# Patient Record
Sex: FEMALE | Race: ASIAN | NOT HISPANIC OR LATINO
[De-identification: names, ages, dates, MRNs, and addresses within clinical notes are randomized per-mention and may not be internally consistent; named-entity substitution may affect disease eponyms.]

---

## 2017-03-22 ENCOUNTER — APPOINTMENT (OUTPATIENT)
Dept: DERMATOLOGY | Facility: CLINIC | Age: 22
End: 2017-03-22

## 2018-10-04 ENCOUNTER — APPOINTMENT (OUTPATIENT)
Dept: FAMILY MEDICINE | Facility: CLINIC | Age: 23
End: 2018-10-04
Payer: COMMERCIAL

## 2018-10-04 ENCOUNTER — MED ADMIN CHARGE (OUTPATIENT)
Age: 23
End: 2018-10-04

## 2018-10-04 VITALS
TEMPERATURE: 98 F | SYSTOLIC BLOOD PRESSURE: 113 MMHG | BODY MASS INDEX: 18.44 KG/M2 | OXYGEN SATURATION: 98 % | DIASTOLIC BLOOD PRESSURE: 79 MMHG | WEIGHT: 108 LBS | HEIGHT: 64 IN | HEART RATE: 96 BPM

## 2018-10-04 DIAGNOSIS — Z78.9 OTHER SPECIFIED HEALTH STATUS: ICD-10-CM

## 2018-10-04 DIAGNOSIS — S02.91XA UNSPECIFIED FRACTURE OF SKULL, INITIAL ENCOUNTER FOR CLOSED FRACTURE: ICD-10-CM

## 2018-10-04 DIAGNOSIS — D50.8 OTHER IRON DEFICIENCY ANEMIAS: ICD-10-CM

## 2018-10-04 DIAGNOSIS — S06.339A UNSPECIFIED FRACTURE OF SKULL, INITIAL ENCOUNTER FOR CLOSED FRACTURE: ICD-10-CM

## 2018-10-04 DIAGNOSIS — Z23 ENCOUNTER FOR IMMUNIZATION: ICD-10-CM

## 2018-10-04 DIAGNOSIS — E61.1 IRON DEFICIENCY: ICD-10-CM

## 2018-10-04 DIAGNOSIS — Z86.19 PERSONAL HISTORY OF OTHER INFECTIOUS AND PARASITIC DISEASES: ICD-10-CM

## 2018-10-04 DIAGNOSIS — Z00.00 ENCOUNTER FOR GENERAL ADULT MEDICAL EXAMINATION W/OUT ABNORMAL FINDINGS: ICD-10-CM

## 2018-10-04 PROCEDURE — 99385 PREV VISIT NEW AGE 18-39: CPT | Mod: 25

## 2018-10-04 PROCEDURE — G0008: CPT

## 2018-10-04 PROCEDURE — 90686 IIV4 VACC NO PRSV 0.5 ML IM: CPT

## 2018-10-04 PROCEDURE — G0444 DEPRESSION SCREEN ANNUAL: CPT

## 2018-10-04 PROCEDURE — 36415 COLL VENOUS BLD VENIPUNCTURE: CPT

## 2018-10-04 RX ORDER — NORGESTIMATE AND ETHINYL ESTRADIOL 7DAYSX3 28
KIT ORAL
Refills: 0 | Status: ACTIVE | COMMUNITY

## 2018-10-04 NOTE — HISTORY OF PRESENT ILLNESS
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 On birth control (tri-linyah)--purchased from an scooby on her phone. Takes as directed, ordered from an scooby. Not on other meds. NKDA.\par Here to establish care. 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 Denies fevers, chills, currently suffering from URI, denies all else on ROS. Interested in establishing care.

## 2018-10-04 NOTE — PLAN
[FreeTextEntry1] : healthcare maintenance: well visit, flu shot today, follow up labs and referrals (psych/ therapy, and gyn)\par \par depression: spent time counseling on depression, resources available, and options. PHQ2 was positive, PHQ9 11 (mod depression), discussed different options- therapy/psych, meds, interested in therapy/ psych as of now, gave a list of phone numbers for [facility] Psych/ therapy offices and will follow up referral, will return if no success or interested in medication route. Denies suicidal/ homicidal thoughts at present. If thoughts do arise has a strong support system to help. Attributes depression to her birth control but not interested in switching at present. \par \par hx of anemia: likely at time secondary to decreased eating post op, will follow up blood panel \par \par hx of molluscum: encouraged follow up with gyn for pap/ routine visit and for treatment options (currently not bothering patient, explained the possibility for continued transmittably to partner), recently tested for stds (neg.)\par - also needs to discuss birth control at gyn appt. \par \par URI/ seasonal allergies: encouraged rest, hydration, hot liquids (soup/ tea), honey. If worsening symptoms, coughing that does not go away, fevers, return to the office. At this point in absence of fever and symptoms that are improving, likely viral and encouraged supportive care/ otc remedies. \par \par discussed body weight, and healthy BMI >18.5 and she is on the cusp \par \par follow up as needed, 1 year or sooner to follow up discussion on depression/ therapy \par \par \par \par

## 2018-10-04 NOTE — HEALTH RISK ASSESSMENT
[No falls in past year] : Patient reported no falls in the past year [2] : 2) Feeling down, depressed, or hopeless for more than half of the days (2) [HIV test declined] : HIV test declined [None] : None [# of Members in Household ___] :  household currently consist of [unfilled] member(s) [Employed] : employed [College] : College [Significant Other] : lives with significant other [Sexually Active] : sexually active [Feels Safe at Home] : Feels safe at home [Fully functional (bathing, dressing, toileting, transferring, walking, feeding)] : Fully functional (bathing, dressing, toileting, transferring, walking, feeding) [Fully functional (using the telephone, shopping, preparing meals, housekeeping, doing laundry, using] : Fully functional and needs no help or supervision to perform IADLs (using the telephone, shopping, preparing meals, housekeeping, doing laundry, using transportation, managing medications and managing finances) [Hepatitis C test declined] : Hepatitis C test declined [] : No [de-identified] : no [de-identified] : none, will recommend a gynecologist  [[id number]] : 4 [Change in mental status noted] : No change in mental status noted [Language] : denies difficulty with language [Handling Complex Tasks] : denies difficulty handling complex tasks [High Risk Behavior] : no high risk behavior [Reports changes in hearing] : Reports no changes in hearing [Reports changes in vision] : Reports no changes in vision [Reports changes in dental health] : Reports no changes in dental health [HIVDate] : 07/18 [HIVComments] : normal  [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

## 2018-10-05 LAB
BASOPHILS # BLD AUTO: 0.05 K/UL
BASOPHILS NFR BLD AUTO: 0.5 %
EOSINOPHIL # BLD AUTO: 0.83 K/UL
EOSINOPHIL NFR BLD AUTO: 9.1 %
FOLATE SERPL-MCNC: 18.3 NG/ML
HCT VFR BLD CALC: 40.8 %
HGB BLD-MCNC: 12.4 G/DL
IMM GRANULOCYTES NFR BLD AUTO: 0.1 %
IRON SATN MFR SERPL: 19 %
IRON SERPL-MCNC: 60 UG/DL
LYMPHOCYTES # BLD AUTO: 1.5 K/UL
LYMPHOCYTES NFR BLD AUTO: 16.4 %
MAN DIFF?: NORMAL
MCHC RBC-ENTMCNC: 29.5 PG
MCHC RBC-ENTMCNC: 30.4 GM/DL
MCV RBC AUTO: 97.1 FL
MONOCYTES # BLD AUTO: 0.53 K/UL
MONOCYTES NFR BLD AUTO: 5.8 %
NEUTROPHILS # BLD AUTO: 6.24 K/UL
NEUTROPHILS NFR BLD AUTO: 68.1 %
PLATELET # BLD AUTO: 207 K/UL
RBC # BLD: 4.2 M/UL
RBC # FLD: 13.5 %
TIBC SERPL-MCNC: 323 UG/DL
UIBC SERPL-MCNC: 263 UG/DL
VIT B12 SERPL-MCNC: 356 PG/ML
WBC # FLD AUTO: 9.16 K/UL

## 2018-11-05 ENCOUNTER — APPOINTMENT (OUTPATIENT)
Dept: FAMILY MEDICINE | Facility: CLINIC | Age: 23
End: 2018-11-05
Payer: COMMERCIAL

## 2018-11-05 VITALS
BODY MASS INDEX: 18.44 KG/M2 | OXYGEN SATURATION: 100 % | WEIGHT: 108 LBS | TEMPERATURE: 97.6 F | HEIGHT: 64 IN | SYSTOLIC BLOOD PRESSURE: 126 MMHG | DIASTOLIC BLOOD PRESSURE: 79 MMHG | HEART RATE: 81 BPM

## 2018-11-05 PROCEDURE — 99214 OFFICE O/P EST MOD 30 MIN: CPT

## 2018-11-06 ENCOUNTER — TRANSCRIPTION ENCOUNTER (OUTPATIENT)
Age: 23
End: 2018-11-06

## 2018-11-07 ENCOUNTER — RX CHANGE (OUTPATIENT)
Age: 23
End: 2018-11-07

## 2018-11-07 NOTE — HEALTH RISK ASSESSMENT
[] : No [No falls in past year] : Patient reported no falls in the past year [2] : 1) Little interest or pleasure doing things for more than half of the days (2) [1] : 2) Feeling down, depressed, or hopeless for several days (1) [de-identified] : none [de-identified] : none [de-identified] : socially [VQG8Saple] : 3 [NSP9Wwwem] : 11 at prior visit last month

## 2018-11-07 NOTE — HISTORY OF PRESENT ILLNESS
[FreeTextEntry8] : 22 yo f presents with depression (nonsuicidal, nonhomicidal). Previous diagnosis of ADHD as a child and was on ritalin intermittently in college. Thought she "was doing the college thing" by taking it intermittently. Post college she has not been able to keep a job. She started tearing in the office. She cannot focus at work or at home. She has not been able to keep a job over the past 4 years-- since college she has lost 3 jobs. Denies fevers, chills, cp, sob, and all else on ROS. Denies anxiety. Stated grew up in a household where depression/ stress/ ADHD was not considered a real complaint. She mentioned it has taken a lot for her to come to the office to discuss this. She remarked she has tried to call therapists in the area-- either they do not take her insurance or they have no availability to after the new year. I offered more numbers but she stated she is waiting to hear back from a few places still.

## 2018-11-07 NOTE — REVIEW OF SYSTEMS
[Suicidal] : not suicidal [Insomnia] : no insomnia [Anxiety] : no anxiety [Depression] : depression [Negative] : Heme/Lymph [de-identified] : cannot focus

## 2018-11-07 NOTE — PLAN
[FreeTextEntry1] : ADHD: encouraged to start medication for depression (last visit PHQ9 was 11- depression), she stated she needs something to work fast because she is at risk of losing her job. Stressed how methylphenidate is a bandaid and will not fix her underlying depression which is likely causing her difficulty focusing and working/ functioning properly at work. She has a follow up with me in 1 month to check labs as well as her progress on the minimal amount of meds I perscribed. She agreed to come in in 1 month. She has hopes to schedule an appt with a therapist at [place] in the meantime. At the followup visit we will seriously discuss starting a medication like lexapro as opposed to this. She was reluctant to try lexapro without trying this first. Risks and benefits discussed. Strongly suggested against not taking this medication on weekends, only in AM before work as needed, and not to mix with alcohol or other drugs. Will follow up in 1 month. Pt. will call me in 2 weeks to update me.

## 2018-11-08 ENCOUNTER — TRANSCRIPTION ENCOUNTER (OUTPATIENT)
Age: 23
End: 2018-11-08

## 2018-11-08 RX ORDER — METHYLPHENIDATE HYDROCHLORIDE 10 MG/1
10 CAPSULE, EXTENDED RELEASE ORAL DAILY
Qty: 15 | Refills: 0 | Status: DISCONTINUED | COMMUNITY
Start: 2018-11-07 | End: 2018-11-08

## 2018-11-08 RX ORDER — METHYLPHENIDATE HYDROCHLORIDE 10 MG/1
10 CAPSULE, EXTENDED RELEASE ORAL DAILY
Qty: 15 | Refills: 0 | Status: DISCONTINUED | COMMUNITY
Start: 2018-11-05 | End: 2018-11-08

## 2018-11-09 ENCOUNTER — TRANSCRIPTION ENCOUNTER (OUTPATIENT)
Age: 23
End: 2018-11-09

## 2018-12-06 ENCOUNTER — APPOINTMENT (OUTPATIENT)
Dept: FAMILY MEDICINE | Facility: CLINIC | Age: 23
End: 2018-12-06
Payer: COMMERCIAL

## 2018-12-06 VITALS
HEIGHT: 64 IN | HEART RATE: 69 BPM | TEMPERATURE: 98.8 F | SYSTOLIC BLOOD PRESSURE: 125 MMHG | OXYGEN SATURATION: 99 % | DIASTOLIC BLOOD PRESSURE: 77 MMHG

## 2018-12-06 PROCEDURE — 99214 OFFICE O/P EST MOD 30 MIN: CPT

## 2018-12-06 NOTE — HEALTH RISK ASSESSMENT
[] : No [No falls in past year] : Patient reported no falls in the past year [1] : 2) Feeling down, depressed, or hopeless for several days (1) [de-identified] : socially on weekends [[id number]] : 2

## 2018-12-06 NOTE — PLAN
[FreeTextEntry1] : follow up visit\par \par ADHD: cont. current medication, follow up in 2 months, full blood panel at that time, will refill meds if interested\par \par depression: meeting with therapist, mood and affect much improved, after therapy will follow up with me and we will discuss antidepressants vs. cont. ADHD meds \par \par weight management: (BMI less than 19)discussed side effect of weight loss while on ADHD medication, already low BMI, encouraged healthy eating habits \par  \par follow up in 2 months for labs and depression/ ADHD

## 2018-12-06 NOTE — COUNSELING
[Weight management counseling provided] : Weight management [Healthy eating counseling provided] : healthy eating [Activity counseling provided] : activity [Behavioral health counseling provided] : behavioral health  [Low Salt Diet] : Low salt diet [Walking] : Walking [Engage in a relaxing activity] : Engage in a relaxing activity [Plan in advance] : Plan in advance [None] : None [Good understanding] : Patient has a good understanding of lifestyle changes and the steps needed to achieve self management goals

## 2018-12-06 NOTE — HISTORY OF PRESENT ILLNESS
[FreeTextEntry8] : 22 yo f with pmhx of ADHD presents for medication follow up. Pt. has not experienced any side effects of the medication and has only been taking it M-F when working, she is not drinking on the medication or experimenting with other medications. Pt. states that she has had more energy throughout the day, better focus--her boss commented on her improved work ethic, she is drinking less coffee, sleeping better, no longer at risk to lose her job, and she is volunteering after work/ going out more. She feels less hopeless, less depressed, her mood is elevated. She is seeing therapist next week (Wednesday/ Thursday) for an initial evaluation which she is looking forward to. Denies fevers, chills, cp, sob, loss of appetite, increased figidty, and all else on ROS. Already had a flu shot this year, up to date with tetanus. \par ADHD: improved focus\par depression: mood improved\par weight management: stable per patient

## 2019-01-07 ENCOUNTER — TRANSCRIPTION ENCOUNTER (OUTPATIENT)
Age: 24
End: 2019-01-07

## 2019-02-07 ENCOUNTER — APPOINTMENT (OUTPATIENT)
Dept: FAMILY MEDICINE | Facility: CLINIC | Age: 24
End: 2019-02-07

## 2019-02-21 ENCOUNTER — APPOINTMENT (OUTPATIENT)
Dept: FAMILY MEDICINE | Facility: CLINIC | Age: 24
End: 2019-02-21
Payer: COMMERCIAL

## 2019-02-21 VITALS
DIASTOLIC BLOOD PRESSURE: 74 MMHG | SYSTOLIC BLOOD PRESSURE: 108 MMHG | HEIGHT: 64 IN | BODY MASS INDEX: 18.1 KG/M2 | WEIGHT: 106 LBS | HEART RATE: 69 BPM | TEMPERATURE: 98.2 F | OXYGEN SATURATION: 100 %

## 2019-02-21 DIAGNOSIS — F90.8 ATTENTION-DEFICIT HYPERACTIVITY DISORDER, OTHER TYPE: ICD-10-CM

## 2019-02-21 PROCEDURE — 99213 OFFICE O/P EST LOW 20 MIN: CPT

## 2019-02-21 RX ORDER — METHYLPHENIDATE HYDROCHLORIDE 10 MG/1
10 CAPSULE, EXTENDED RELEASE ORAL DAILY
Qty: 20 | Refills: 0 | Status: DISCONTINUED | COMMUNITY
Start: 2018-12-06 | End: 2019-02-21

## 2019-02-21 RX ORDER — METHYLPHENIDATE HYDROCHLORIDE 10 MG/1
10 CAPSULE, EXTENDED RELEASE ORAL DAILY
Qty: 15 | Refills: 0 | Status: DISCONTINUED | COMMUNITY
Start: 2018-11-08 | End: 2019-02-21

## 2019-02-21 NOTE — HISTORY OF PRESENT ILLNESS
[FreeTextEntry1] : follow up medications  [de-identified] : 24 yo f presents for follow up of medications. Was started on ADHD medications the past few visits and dosing was increased. Since last visit patient remarked she had lost her job so she has not been taking the medications. She mentioned that she feels depressed but is in good spirits as she has a few job interviews lined up. Pt. stated that she interested in treating her depression and is interested in starting to discuss medication options. Pt. had mentioned she had a meeting with a therapist at [place] and the one visit went well and she looks forward to continuing with them. Today the pt. denies fevers, chills, cp, sob, and all else on ROS. Pt. denies suicidal and homicidal ideations. Last PHQ9 in Oct. 18 was positive for depression. Will repeat at next visit after has been on medication.

## 2019-02-21 NOTE — HEALTH RISK ASSESSMENT
[No falls in past year] : Patient reported no falls in the past year [3] : 2) Feeling down, depressed, or hopeless for nearly every day (3) [] : No [de-identified] : occasionally [de-identified] : walking  [FAQ1Hjysq] : 6 [YBL1Fmtnd] : 11

## 2019-02-28 ENCOUNTER — TRANSCRIPTION ENCOUNTER (OUTPATIENT)
Age: 24
End: 2019-02-28

## 2019-03-21 ENCOUNTER — APPOINTMENT (OUTPATIENT)
Dept: FAMILY MEDICINE | Facility: CLINIC | Age: 24
End: 2019-03-21

## 2019-04-05 ENCOUNTER — TRANSCRIPTION ENCOUNTER (OUTPATIENT)
Age: 24
End: 2019-04-05

## 2019-05-08 ENCOUNTER — RX RENEWAL (OUTPATIENT)
Age: 24
End: 2019-05-08

## 2019-07-08 ENCOUNTER — RX RENEWAL (OUTPATIENT)
Age: 24
End: 2019-07-08

## 2019-07-15 ENCOUNTER — APPOINTMENT (OUTPATIENT)
Dept: FAMILY MEDICINE | Facility: CLINIC | Age: 24
End: 2019-07-15

## 2019-07-18 ENCOUNTER — RX RENEWAL (OUTPATIENT)
Age: 24
End: 2019-07-18

## 2019-07-19 ENCOUNTER — APPOINTMENT (OUTPATIENT)
Dept: FAMILY MEDICINE | Facility: CLINIC | Age: 24
End: 2019-07-19

## 2019-07-25 ENCOUNTER — MEDICATION RENEWAL (OUTPATIENT)
Age: 24
End: 2019-07-25

## 2019-08-05 ENCOUNTER — APPOINTMENT (OUTPATIENT)
Dept: FAMILY MEDICINE | Facility: CLINIC | Age: 24
End: 2019-08-05
Payer: COMMERCIAL

## 2019-08-05 VITALS
WEIGHT: 103 LBS | DIASTOLIC BLOOD PRESSURE: 79 MMHG | BODY MASS INDEX: 17.58 KG/M2 | SYSTOLIC BLOOD PRESSURE: 114 MMHG | HEART RATE: 58 BPM | OXYGEN SATURATION: 100 % | HEIGHT: 64 IN | TEMPERATURE: 98 F

## 2019-08-05 DIAGNOSIS — F32.9 MAJOR DEPRESSIVE DISORDER, SINGLE EPISODE, UNSPECIFIED: ICD-10-CM

## 2019-08-05 PROCEDURE — 99213 OFFICE O/P EST LOW 20 MIN: CPT

## 2019-08-06 PROBLEM — F32.9 DEPRESSION: Status: ACTIVE | Noted: 2018-10-04

## 2019-08-06 NOTE — HISTORY OF PRESENT ILLNESS
[FreeTextEntry1] : follow up meds [de-identified] : 22 yo f presents to discuss her lexapro. \par Pt. has missed/ [facility] the past few appts. \par Pt. was started on lexapro 5 mg, and is very happy with the choice to be on an everyday medication. \par Pt. was recently hired for a new job. \par She mentions she recently stopped her birth control and is considering restarting it. \par Today pt. denies fevers, chills, cp, sob, and is in her usual state of health.

## 2019-08-06 NOTE — COUNSELING
[Behavioral health counseling provided] : Behavioral health counseling provided [Engage in a relaxing activity] : Engage in a relaxing activity [Plan in advance] : Plan in advance [None] : None [Good understanding] : Patient has a good understanding of lifestyle changes and steps needed to achieve self management goal

## 2019-08-06 NOTE — PLAN
[FreeTextEntry1] : will do labs next visit.\par follow up in 6 weeks to discuss increased medication dose.

## 2019-08-06 NOTE — PHYSICAL EXAM
[No Acute Distress] : no acute distress [Well Nourished] : well nourished [Well Developed] : well developed [Normal Sclera/Conjunctiva] : normal sclera/conjunctiva [Well-Appearing] : well-appearing [EOMI] : extraocular movements intact [PERRL] : pupils equal round and reactive to light [Normal Outer Ear/Nose] : the outer ears and nose were normal in appearance [Normal Oropharynx] : the oropharynx was normal [No JVD] : no jugular venous distention [No Lymphadenopathy] : no lymphadenopathy [Supple] : supple [Thyroid Normal, No Nodules] : the thyroid was normal and there were no nodules present [No Respiratory Distress] : no respiratory distress  [No Accessory Muscle Use] : no accessory muscle use [Clear to Auscultation] : lungs were clear to auscultation bilaterally [Regular Rhythm] : with a regular rhythm [Normal Rate] : normal rate  [Normal S1, S2] : normal S1 and S2 [No Carotid Bruits] : no carotid bruits [No Murmur] : no murmur heard [No Abdominal Bruit] : a ~M bruit was not heard ~T in the abdomen [No Varicosities] : no varicosities [Pedal Pulses Present] : the pedal pulses are present [No Edema] : there was no peripheral edema [No Palpable Aorta] : no palpable aorta [No Extremity Clubbing/Cyanosis] : no extremity clubbing/cyanosis [Soft] : abdomen soft [Non Tender] : non-tender [Non-distended] : non-distended [No Masses] : no abdominal mass palpated [No HSM] : no HSM [Normal Bowel Sounds] : normal bowel sounds [Normal Posterior Cervical Nodes] : no posterior cervical lymphadenopathy [Normal Anterior Cervical Nodes] : no anterior cervical lymphadenopathy [No CVA Tenderness] : no CVA  tenderness [No Spinal Tenderness] : no spinal tenderness [No Joint Swelling] : no joint swelling [Grossly Normal Strength/Tone] : grossly normal strength/tone [No Rash] : no rash [Coordination Grossly Intact] : coordination grossly intact [Normal Gait] : normal gait [No Focal Deficits] : no focal deficits [Normal Affect] : the affect was normal [Deep Tendon Reflexes (DTR)] : deep tendon reflexes were 2+ and symmetric [Normal Insight/Judgement] : insight and judgment were intact

## 2019-08-06 NOTE — REVIEW OF SYSTEMS
[de-identified] : admits less anxiety and depression, admits increased focus at work  [Negative] : Heme/Lymph

## 2019-09-04 ENCOUNTER — LABORATORY RESULT (OUTPATIENT)
Age: 24
End: 2019-09-04

## 2019-09-04 ENCOUNTER — APPOINTMENT (OUTPATIENT)
Dept: FAMILY MEDICINE | Facility: CLINIC | Age: 24
End: 2019-09-04
Payer: COMMERCIAL

## 2019-09-04 VITALS
DIASTOLIC BLOOD PRESSURE: 74 MMHG | TEMPERATURE: 98 F | BODY MASS INDEX: 19.46 KG/M2 | WEIGHT: 114 LBS | HEIGHT: 64 IN | SYSTOLIC BLOOD PRESSURE: 111 MMHG | HEART RATE: 89 BPM | OXYGEN SATURATION: 100 %

## 2019-09-04 DIAGNOSIS — Z87.09 PERSONAL HISTORY OF OTHER DISEASES OF THE RESPIRATORY SYSTEM: ICD-10-CM

## 2019-09-04 DIAGNOSIS — Z11.3 ENCOUNTER FOR SCREENING FOR INFECTIONS WITH A PREDOMINANTLY SEXUAL MODE OF TRANSMISSION: ICD-10-CM

## 2019-09-04 DIAGNOSIS — J35.8 OTHER CHRONIC DISEASES OF TONSILS AND ADENOIDS: ICD-10-CM

## 2019-09-04 DIAGNOSIS — T78.40XA ALLERGY, UNSPECIFIED, INITIAL ENCOUNTER: ICD-10-CM

## 2019-09-04 DIAGNOSIS — R05 COUGH: ICD-10-CM

## 2019-09-04 LAB — S PYO AG SPEC QL IA: NEGATIVE

## 2019-09-04 PROCEDURE — 87880 STREP A ASSAY W/OPTIC: CPT | Mod: QW

## 2019-09-04 PROCEDURE — 99214 OFFICE O/P EST MOD 30 MIN: CPT | Mod: 25

## 2019-09-04 PROCEDURE — 36415 COLL VENOUS BLD VENIPUNCTURE: CPT

## 2019-09-04 PROCEDURE — 81002 URINALYSIS NONAUTO W/O SCOPE: CPT

## 2019-09-04 RX ORDER — HYDROCORTISONE 10 MG/G
1 CREAM TOPICAL
Qty: 1 | Refills: 0 | Status: ACTIVE | COMMUNITY
Start: 2019-09-04 | End: 1900-01-01

## 2019-09-04 RX ORDER — LORATADINE 5 MG/5 ML
10 SOLUTION, ORAL ORAL
Qty: 1 | Refills: 0 | Status: ACTIVE | COMMUNITY
Start: 2019-09-04 | End: 1900-01-01

## 2019-09-04 RX ORDER — CAMPHOR 0.45 %
25 GEL (GRAM) TOPICAL
Qty: 1 | Refills: 0 | Status: ACTIVE | COMMUNITY
Start: 2019-09-04 | End: 1900-01-01

## 2019-09-04 NOTE — HISTORY OF PRESENT ILLNESS
[FreeTextEntry8] : cc: flu like illness, rash, medication follow up, std check  \par \par 22 yo f presents to discuss her flu like illness. For the past 3 days she has had a runny/ stuffy nose, a cough, denies fevers, chills, muscle aches. Admits a sore throat, was significantly painful a few days ago, now only bothers her when she coughs hard. Cough is nonproductive and only when she takes a deep breath. Pt. remarks she feels better than she has over the past few days. Has not tried anything otc as of yet. She states as though she is up to date with her childhood vaccinations. Denies n/v/d/c and denies fatigue. \par \par Pt. noticed a rash that started on her arms a few days ago, the rash went away on her arms and is now on her legs. Yesterday she used hydrocortisone cream x 1, the rash got much better but is still present. Pt. denies camping. Pt. admits using a dirty ("dodge-y") public washing machine the other day and a public pool yesterday. The rash itches and spares flexors/ extensor and palmar surfaces, not plaque like, it is macular papular, her skin is slightly erythematous, but she remarks less than yesterday. Denies hand/ foot rash. Denies camping. Her body does not feel hot either. She admits to her arms feeling itchy after she put on a sweatshirt that she put in the public washing machine. She has not tried benadryl. No where else on her body aside from her legs. No burning pain, no pustules, no stiff neck no headaches no photophobia. \par \par Pt. was started last visit on lexapro 10 mg (increased from 5 mg). Pt. is happy on this dose, no side effects, feels much more like herself. Pt. admits to starting birth control more recently. Pt. states she feels less depressed and anxious and has better focus at work. Pt. takes medication as perscribed. \par \par Pt. mentions she was having intercourse and there was a period of time that it was not with her boyfriend and there was unprotected penetration a few weeks ago. Pt. would like to have STD testing. Pt. denies urinary complaints, does not notice any bumps or rashes in her genital region. Denies migratory arthritis.

## 2019-09-04 NOTE — REVIEW OF SYSTEMS
[Shortness Of Breath] : no shortness of breath [Cough] : cough [Wheezing] : no wheezing [Dyspnea on Exertion] : no dyspnea on exertion [Itching] : Itching [Mole Changes] : no mole changes [Nail Changes] : no nail changes [Skin Rash] : skin rash [Hair Changes] : no hair changes [FreeTextEntry4] : improving sore throat, previous nasal d/c now resolved [Negative] : Heme/Lymph

## 2019-09-04 NOTE — COUNSELING
[Behavioral health counseling provided] : Behavioral health counseling provided [Engage in a relaxing activity] : Engage in a relaxing activity

## 2019-09-05 LAB
ALBUMIN SERPL ELPH-MCNC: 4.1 G/DL
ALP BLD-CCNC: 45 U/L
ALT SERPL-CCNC: 13 U/L
ANION GAP SERPL CALC-SCNC: 11 MMOL/L
APPEARANCE: ABNORMAL
AST SERPL-CCNC: 16 U/L
BILIRUB SERPL-MCNC: <0.2 MG/DL
BILIRUBIN URINE: NEGATIVE
BLOOD URINE: NEGATIVE
BUN SERPL-MCNC: 9 MG/DL
C TRACH RRNA SPEC QL NAA+PROBE: NOT DETECTED
CALCIUM SERPL-MCNC: 9.1 MG/DL
CHLORIDE SERPL-SCNC: 103 MMOL/L
CO2 SERPL-SCNC: 24 MMOL/L
COLOR: YELLOW
CREAT SERPL-MCNC: 0.67 MG/DL
GLUCOSE QUALITATIVE U: NEGATIVE
GLUCOSE SERPL-MCNC: 99 MG/DL
HCG SERPL-MCNC: <1 MIU/ML
HIV1+2 AB SPEC QL IA.RAPID: NONREACTIVE
KETONES URINE: NEGATIVE
LEUKOCYTE ESTERASE URINE: NEGATIVE
N GONORRHOEA RRNA SPEC QL NAA+PROBE: NOT DETECTED
NITRITE URINE: NEGATIVE
PH URINE: 6
POTASSIUM SERPL-SCNC: 3.8 MMOL/L
PROT SERPL-MCNC: 6.6 G/DL
PROTEIN URINE: NEGATIVE
SODIUM SERPL-SCNC: 138 MMOL/L
SOURCE AMPLIFICATION: NORMAL
SPECIFIC GRAVITY URINE: 1.02
T PALLIDUM AB SER QL IA: NEGATIVE
UROBILINOGEN URINE: NORMAL

## 2019-09-06 LAB
EBV EA AB SER IA-ACNC: <5 U/ML
EBV EA AB TITR SER IF: NEGATIVE
EBV EA IGG SER QL IA: 16.3 U/ML
EBV EA IGG SER-ACNC: NEGATIVE
EBV EA IGM SER IA-ACNC: NEGATIVE
EBV PATRN SPEC IB-IMP: NORMAL
EBV VCA IGG SER IA-ACNC: 216 U/ML
EBV VCA IGM SER QL IA: <10 U/ML
EPSTEIN-BARR VIRUS CAPSID ANTIGEN IGG: POSITIVE
HAV IGM SER QL: NONREACTIVE
HBV CORE IGM SER QL: NONREACTIVE
HBV SURFACE AG SER QL: NONREACTIVE
HCV AB SER QL: NONREACTIVE
HCV S/CO RATIO: 0.1 S/CO
HETEROPH AB SER QL: NEGATIVE
MEV IGG FLD QL IA: 257 AU/ML
MEV IGG+IGM SER-IMP: POSITIVE
MUV AB SER-ACNC: POSITIVE
MUV IGG SER QL IA: 20.4 AU/ML
RUBV IGG FLD-ACNC: 4.1 INDEX
RUBV IGG SER-IMP: POSITIVE
VZV AB TITR SER: POSITIVE
VZV IGG SER IF-ACNC: 888.3 INDEX

## 2019-09-09 LAB
BACTERIA THROAT CULT: NORMAL
MEV IGM SER QL: NEGATIVE
RUBV IGM FLD-ACNC: <20 AU/ML
VZV IGM SER IF-ACNC: <0.91 INDEX

## 2019-09-10 LAB — MUV IGM SER QL IA: <0.8 AU

## 2019-09-30 ENCOUNTER — TRANSCRIPTION ENCOUNTER (OUTPATIENT)
Age: 24
End: 2019-09-30

## 2019-09-30 ENCOUNTER — RX RENEWAL (OUTPATIENT)
Age: 24
End: 2019-09-30

## 2019-09-30 RX ORDER — ESCITALOPRAM OXALATE 5 MG/1
5 TABLET ORAL DAILY
Qty: 7 | Refills: 0 | Status: DISCONTINUED | COMMUNITY
Start: 2019-02-21 | End: 2019-09-30

## 2019-09-30 RX ORDER — ESCITALOPRAM OXALATE 10 MG/1
10 TABLET ORAL
Qty: 90 | Refills: 0 | Status: ACTIVE | COMMUNITY
Start: 2019-08-05 | End: 1900-01-01

## 2019-10-21 ENCOUNTER — TRANSCRIPTION ENCOUNTER (OUTPATIENT)
Age: 24
End: 2019-10-21

## 2019-10-23 ENCOUNTER — TRANSCRIPTION ENCOUNTER (OUTPATIENT)
Age: 24
End: 2019-10-23

## 2020-05-01 ENCOUNTER — TRANSCRIPTION ENCOUNTER (OUTPATIENT)
Age: 25
End: 2020-05-01

## 2020-12-21 PROBLEM — Z87.09 HISTORY OF UPPER RESPIRATORY INFECTION: Status: RESOLVED | Noted: 2018-10-04 | Resolved: 2020-12-21

## 2024-06-03 ENCOUNTER — LABORATORY RESULT (OUTPATIENT)
Age: 29
End: 2024-06-03

## 2024-06-03 ENCOUNTER — APPOINTMENT (OUTPATIENT)
Dept: INTERNAL MEDICINE | Facility: CLINIC | Age: 29
End: 2024-06-03
Payer: COMMERCIAL

## 2024-06-03 VITALS
BODY MASS INDEX: 19.12 KG/M2 | HEART RATE: 75 BPM | SYSTOLIC BLOOD PRESSURE: 126 MMHG | OXYGEN SATURATION: 98 % | DIASTOLIC BLOOD PRESSURE: 82 MMHG | HEIGHT: 64 IN | TEMPERATURE: 97.2 F | WEIGHT: 112 LBS

## 2024-06-03 DIAGNOSIS — R35.0 FREQUENCY OF MICTURITION: ICD-10-CM

## 2024-06-03 DIAGNOSIS — L65.9 NONSCARRING HAIR LOSS, UNSPECIFIED: ICD-10-CM

## 2024-06-03 DIAGNOSIS — Z12.4 ENCOUNTER FOR SCREENING FOR MALIGNANT NEOPLASM OF CERVIX: ICD-10-CM

## 2024-06-03 DIAGNOSIS — Z30.41 ENCOUNTER FOR SURVEILLANCE OF CONTRACEPTIVE PILLS: ICD-10-CM

## 2024-06-03 PROCEDURE — 99385 PREV VISIT NEW AGE 18-39: CPT

## 2024-06-03 PROCEDURE — 36415 COLL VENOUS BLD VENIPUNCTURE: CPT

## 2024-06-03 RX ORDER — NORETHINDRONE ACETATE AND ETHINYL ESTRADIOL, ETHINYL ESTRADIOL AND FERROUS FUMARATE 1MG-10(24)
1 MG-10 MCG / KIT ORAL DAILY
Qty: 1 | Refills: 0 | Status: ACTIVE | COMMUNITY
Start: 2024-06-03 | End: 1900-01-01

## 2024-06-04 NOTE — HISTORY OF PRESENT ILLNESS
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 On birth control. presents to establish care.

## 2024-06-04 NOTE — ASSESSMENT
[FreeTextEntry1] : Hair loss - currently on minoxidil  - derm referral  OCP -  pt failed multiple ocps  - lo loestrin  - ob/gyn referral  Follows with therapist   HCM pap smear unknown Vaccines UTD - fully vaccinated for trip to [place] 04/2024

## 2024-06-04 NOTE — HEALTH RISK ASSESSMENT
[Good] : ~his/her~  mood as  good [Yes] : Yes [2 - 4 times a month (2 pts)] : 2-4 times a month (2 points) [1 or 2 (0 pts)] : 1 or 2 (0 points) [Never (0 pts)] : Never (0 points) [0] : 2) Feeling down, depressed, or hopeless: Not at all (0) [PHQ-2 Negative - No further assessment needed] : PHQ-2 Negative - No further assessment needed [Never] : Never [None] : None [With Significant Other] : lives with significant other [Employed] : employed [Significant Other] : lives with significant other [Fully functional (bathing, dressing, toileting, transferring, walking, feeding)] : Fully functional (bathing, dressing, toileting, transferring, walking, feeding) [Fully functional (using the telephone, shopping, preparing meals, housekeeping, doing laundry, using] : Fully functional and needs no help or supervision to perform IADLs (using the telephone, shopping, preparing meals, housekeeping, doing laundry, using transportation, managing medications and managing finances) [Reports normal functional visual acuity (ie: able to read med bottle)] : Reports normal functional visual acuity [Smoke Detector] : smoke detector [Carbon Monoxide Detector] : carbon monoxide detector [Seat Belt] :  uses seat belt [Sunscreen] : uses sunscreen [Travel to Developing Areas] : travel to developing areas [de-identified] : walking  [de-identified] : balanced lean proteins vegetables home cooked [HEP6Efosi] : 0 [Change in mental status noted] : No change in mental status noted [Language] : denies difficulty with language [Behavior] : denies difficulty with behavior [Learning/Retaining New Information] : denies difficulty learning/retaining new information [Handling Complex Tasks] : denies difficulty handling complex tasks [Reasoning] : denies difficulty with reasoning [Spatial Ability and Orientation] : denies difficulty with spatial ability and orientation [Reports changes in hearing] : Reports no changes in hearing [Reports changes in vision] : Reports no changes in vision [Reports changes in dental health] : Reports no changes in dental health [Guns at Home] : no guns at home [TB Exposure] : is not being exposed to tuberculosis [Caregiver Concerns] : does not have caregiver concerns

## 2024-06-11 ENCOUNTER — NON-APPOINTMENT (OUTPATIENT)
Age: 29
End: 2024-06-11

## 2024-06-11 LAB
ALBUMIN SERPL ELPH-MCNC: 4.4 G/DL
ALP BLD-CCNC: 39 U/L
ALT SERPL-CCNC: 11 U/L
ANION GAP SERPL CALC-SCNC: 11 MMOL/L
APPEARANCE: CLEAR
AST SERPL-CCNC: 17 U/L
BILIRUB SERPL-MCNC: 0.2 MG/DL
BILIRUBIN URINE: NEGATIVE
BLOOD URINE: ABNORMAL
BUN SERPL-MCNC: 8 MG/DL
CALCIUM SERPL-MCNC: 9.2 MG/DL
CHLORIDE SERPL-SCNC: 104 MMOL/L
CHOLEST SERPL-MCNC: 203 MG/DL
CO2 SERPL-SCNC: 23 MMOL/L
COLOR: YELLOW
CREAT SERPL-MCNC: 0.63 MG/DL
EGFR: 122 ML/MIN/1.73M2
FERRITIN SERPL-MCNC: 18 NG/ML
GLUCOSE QUALITATIVE U: NEGATIVE MG/DL
GLUCOSE SERPL-MCNC: 99 MG/DL
HDLC SERPL-MCNC: 74 MG/DL
IRON SATN MFR SERPL: 14 %
IRON SERPL-MCNC: 46 UG/DL
KETONES URINE: NEGATIVE MG/DL
LDLC SERPL CALC-MCNC: 120 MG/DL
LEUKOCYTE ESTERASE URINE: NEGATIVE
NITRITE URINE: NEGATIVE
NONHDLC SERPL-MCNC: 129 MG/DL
PH URINE: 6.5
POTASSIUM SERPL-SCNC: 4.4 MMOL/L
PROT SERPL-MCNC: 7 G/DL
PROTEIN URINE: NEGATIVE MG/DL
SODIUM SERPL-SCNC: 138 MMOL/L
SPECIFIC GRAVITY URINE: 1.01
TIBC SERPL-MCNC: 316 UG/DL
TRIGL SERPL-MCNC: 53 MG/DL
TSH SERPL-ACNC: 1.67 UIU/ML
UIBC SERPL-MCNC: 270 UG/DL
UROBILINOGEN URINE: 0.2 MG/DL

## 2024-06-11 RX ORDER — MINOXIDIL 2.5 MG/1
2.5 TABLET ORAL DAILY
Qty: 90 | Refills: 2 | Status: ACTIVE | COMMUNITY
Start: 2024-06-11 | End: 1900-01-01

## 2024-12-24 PROBLEM — F10.90 ALCOHOL USE: Status: ACTIVE | Noted: 2018-10-04

## 2025-07-03 ENCOUNTER — NON-APPOINTMENT (OUTPATIENT)
Age: 30
End: 2025-07-03

## 2025-07-03 ENCOUNTER — APPOINTMENT (OUTPATIENT)
Dept: INTERNAL MEDICINE | Facility: CLINIC | Age: 30
End: 2025-07-03
Payer: COMMERCIAL

## 2025-07-03 VITALS
RESPIRATION RATE: 14 BRPM | SYSTOLIC BLOOD PRESSURE: 115 MMHG | WEIGHT: 126 LBS | HEIGHT: 64 IN | TEMPERATURE: 98 F | DIASTOLIC BLOOD PRESSURE: 64 MMHG | OXYGEN SATURATION: 98 % | BODY MASS INDEX: 21.51 KG/M2 | HEART RATE: 100 BPM

## 2025-07-03 VITALS
TEMPERATURE: 98 F | WEIGHT: 126 LBS | DIASTOLIC BLOOD PRESSURE: 64 MMHG | HEART RATE: 100 BPM | BODY MASS INDEX: 21.51 KG/M2 | OXYGEN SATURATION: 98 % | SYSTOLIC BLOOD PRESSURE: 115 MMHG | HEIGHT: 64 IN

## 2025-07-03 PROBLEM — Z77.22 SECONDHAND SMOKE EXPOSURE: Status: RESOLVED | Noted: 2025-07-03 | Resolved: 2025-07-03

## 2025-07-03 PROBLEM — Z01.00 VISIT FOR EYE AND VISION EXAM: Status: RESOLVED | Noted: 2025-07-03 | Resolved: 2025-07-03

## 2025-07-03 PROBLEM — Z97.3 WEARS GLASSES: Status: RESOLVED | Noted: 2025-07-03 | Resolved: 2025-07-03

## 2025-07-03 PROCEDURE — 36415 COLL VENOUS BLD VENIPUNCTURE: CPT

## 2025-07-03 PROCEDURE — 99213 OFFICE O/P EST LOW 20 MIN: CPT | Mod: 25

## 2025-07-03 PROCEDURE — 99395 PREV VISIT EST AGE 18-39: CPT

## 2025-07-07 LAB
25(OH)D3 SERPL-MCNC: 42.4 NG/ML
ALBUMIN SERPL ELPH-MCNC: 4.3 G/DL
ALP BLD-CCNC: 48 U/L
ALT SERPL-CCNC: 21 U/L
ANION GAP SERPL CALC-SCNC: 13 MMOL/L
AST SERPL-CCNC: 22 U/L
BASOPHILS # BLD AUTO: 0.08 K/UL
BASOPHILS NFR BLD AUTO: 1.3 %
BILIRUB SERPL-MCNC: 0.2 MG/DL
BUN SERPL-MCNC: 12 MG/DL
CALCIUM SERPL-MCNC: 9.2 MG/DL
CHLORIDE SERPL-SCNC: 105 MMOL/L
CHOLEST SERPL-MCNC: 190 MG/DL
CO2 SERPL-SCNC: 21 MMOL/L
CREAT SERPL-MCNC: 0.61 MG/DL
EGFRCR SERPLBLD CKD-EPI 2021: 122 ML/MIN/1.73M2
EOSINOPHIL # BLD AUTO: 0.35 K/UL
EOSINOPHIL NFR BLD AUTO: 5.6 %
GLUCOSE SERPL-MCNC: 98 MG/DL
HCT VFR BLD CALC: 36 %
HDLC SERPL-MCNC: 80 MG/DL
HGB BLD-MCNC: 11.2 G/DL
HIV1+2 AB SPEC QL IA.RAPID: NONREACTIVE
HSV 1+2 IGG SER IA-IMP: NEGATIVE
HSV 1+2 IGG SER IA-IMP: NEGATIVE
HSV1 IGG SER QL: 0.02 INDEX
HSV2 IGG SER QL: 0.13 INDEX
IMM GRANULOCYTES NFR BLD AUTO: 0.2 %
LDLC SERPL-MCNC: 99 MG/DL
LYMPHOCYTES # BLD AUTO: 2.47 K/UL
LYMPHOCYTES NFR BLD AUTO: 39.7 %
MAN DIFF?: NORMAL
MCHC RBC-ENTMCNC: 28.7 PG
MCHC RBC-ENTMCNC: 31.1 G/DL
MCV RBC AUTO: 92.3 FL
MONOCYTES # BLD AUTO: 0.51 K/UL
MONOCYTES NFR BLD AUTO: 8.2 %
NEUTROPHILS # BLD AUTO: 2.8 K/UL
NEUTROPHILS NFR BLD AUTO: 45 %
NONHDLC SERPL-MCNC: 110 MG/DL
PLATELET # BLD AUTO: 247 K/UL
POTASSIUM SERPL-SCNC: 4.6 MMOL/L
PROT SERPL-MCNC: 7 G/DL
RBC # BLD: 3.9 M/UL
RBC # FLD: 15.1 %
SODIUM SERPL-SCNC: 139 MMOL/L
TRIGL SERPL-MCNC: 57 MG/DL
TSH SERPL-ACNC: 1.39 UIU/ML
VIT B12 SERPL-MCNC: 509 PG/ML
WBC # FLD AUTO: 6.22 K/UL

## 2025-07-09 RX ORDER — METHYLPHENIDATE HYDROCHLORIDE 10 MG/1
10 CAPSULE, EXTENDED RELEASE ORAL
Qty: 30 | Refills: 0 | Status: ACTIVE | COMMUNITY
Start: 2025-07-03 | End: 1900-01-01

## 2025-07-28 ENCOUNTER — NON-APPOINTMENT (OUTPATIENT)
Age: 30
End: 2025-07-28

## 2025-08-04 ENCOUNTER — APPOINTMENT (OUTPATIENT)
Dept: INTERNAL MEDICINE | Facility: CLINIC | Age: 30
End: 2025-08-04
Payer: COMMERCIAL

## 2025-08-04 DIAGNOSIS — Z79.899 OTHER LONG TERM (CURRENT) DRUG THERAPY: ICD-10-CM

## 2025-08-04 DIAGNOSIS — F90.8 ATTENTION-DEFICIT HYPERACTIVITY DISORDER, OTHER TYPE: ICD-10-CM

## 2025-08-04 PROCEDURE — 99213 OFFICE O/P EST LOW 20 MIN: CPT | Mod: 95

## 2025-08-04 PROCEDURE — G2211 COMPLEX E/M VISIT ADD ON: CPT | Mod: NC,95
